# Patient Record
Sex: MALE | Race: WHITE | NOT HISPANIC OR LATINO | Employment: OTHER | URBAN - NONMETROPOLITAN AREA
[De-identification: names, ages, dates, MRNs, and addresses within clinical notes are randomized per-mention and may not be internally consistent; named-entity substitution may affect disease eponyms.]

---

## 2017-02-27 ENCOUNTER — OFFICE VISIT (OUTPATIENT)
Dept: CARDIOLOGY | Facility: CLINIC | Age: 82
End: 2017-02-27

## 2017-02-27 VITALS
HEART RATE: 74 BPM | SYSTOLIC BLOOD PRESSURE: 118 MMHG | HEIGHT: 65 IN | WEIGHT: 161.2 LBS | DIASTOLIC BLOOD PRESSURE: 68 MMHG | BODY MASS INDEX: 26.86 KG/M2

## 2017-02-27 DIAGNOSIS — I48.0 PAROXYSMAL ATRIAL FIBRILLATION (HCC): Primary | ICD-10-CM

## 2017-02-27 DIAGNOSIS — I25.810 CORONARY ARTERY DISEASE INVOLVING CORONARY BYPASS GRAFT OF NATIVE HEART WITHOUT ANGINA PECTORIS: ICD-10-CM

## 2017-02-27 DIAGNOSIS — I10 ESSENTIAL HYPERTENSION: ICD-10-CM

## 2017-02-27 DIAGNOSIS — E78.2 MIXED HYPERLIPIDEMIA: ICD-10-CM

## 2017-02-27 PROBLEM — I48.20 CHRONIC ATRIAL FIBRILLATION (HCC): Status: ACTIVE | Noted: 2017-02-27

## 2017-02-27 PROCEDURE — 99205 OFFICE O/P NEW HI 60 MIN: CPT | Performed by: INTERNAL MEDICINE

## 2017-02-27 PROCEDURE — 93000 ELECTROCARDIOGRAM COMPLETE: CPT | Performed by: INTERNAL MEDICINE

## 2017-02-27 NOTE — PROGRESS NOTES
Referring Provider: Antwon Lopez MD    Reason for Follow-up Visit: AFIB    Subjective .   Chief Complaint:   Chief Complaint   Patient presents with   • Atrial Fibrillation     Bradycardia, palpitations - Per Dr. Jessica Posey, [place]    • Edema     left ankle when too much sodium intake        History of present illness:  Romario Garcia is a 87 y.o. yo male with history of CAD, s/p CABG. Denies CP or SOB. Has recently diagnosed Afib. He noticed an irregular heart rate on his BP monitor       History  Past Medical History   Diagnosis Date   • A-fib    • Anemia    • Bradycardia    • CAD (coronary artery disease)    • Constipation    • DJD (degenerative joint disease)    • H/O colonoscopy    • Hyperlipidemia    • Hypertension    • Kidney disease    • Myocardial infarction    • PVC (premature ventricular contraction)    • Sleep apnea    • Varicose veins of legs    ,   Past Surgical History   Procedure Laterality Date   • Hemorrhoidectomy     • Coronary artery bypass graft     • Coronary angioplasty with stent placement     • Colonoscopy     • Cardiac catheterization     ,   Family History   Problem Relation Age of Onset   • Stroke Mother    • Heart disease Father    • Coronary artery disease Brother    • Coronary artery disease Paternal Uncle      x4 uncles   ,   Social History   Substance Use Topics   • Smoking status: Former Smoker   • Smokeless tobacco: Current User     Types: Chew      Comment: quit age 45    • Alcohol use No   ,     Medications  Current Outpatient Prescriptions   Medication Sig Dispense Refill   • aspirin 81 MG EC tablet Take 81 mg by mouth Daily.     • lisinopril (PRINIVIL,ZESTRIL) 10 MG tablet Take 10 mg by mouth Daily.     • nitroglycerin (NITROSTAT) 0.4 MG SL tablet Place 0.4 mg under the tongue Every 5 (Five) Minutes As Needed for chest pain. Take no more than 3 doses in 15 minutes.     • pravastatin (PRAVACHOL) 20 MG tablet Take 20 mg by mouth Daily.     • metoprolol tartrate  "(LOPRESSOR) 25 MG tablet Take 25 mg by mouth 2 (Two) Times a Day. 1/2 bid       No current facility-administered medications for this visit.        Allergies:  Review of patient's allergies indicates no known allergies.    Review of Systems  Review of Systems   HENT: Negative for nosebleeds.    Cardiovascular: Negative for chest pain, claudication, dyspnea on exertion, irregular heartbeat, leg swelling, near-syncope, orthopnea, palpitations, paroxysmal nocturnal dyspnea and syncope.   Respiratory: Negative for cough, hemoptysis and shortness of breath.    Gastrointestinal: Negative for dysphagia, hematemesis and melena.   Genitourinary: Negative for hematuria.       Objective     Physical Exam:  Visit Vitals   • /68 (BP Location: Left arm, Patient Position: Sitting)   • Pulse 74   • Ht 65\" (165.1 cm)   • Wt 161 lb 3.2 oz (73.1 kg)   • BMI 26.83 kg/m2     Physical Exam   Constitutional: He is oriented to person, place, and time. He appears well-developed and well-nourished. No distress.   HENT:   Head: Normocephalic.   Eyes: No scleral icterus.   Neck: Normal range of motion. Neck supple.   Cardiovascular: Normal rate and normal heart sounds.  An irregularly irregular rhythm present. Exam reveals no gallop and no friction rub.    No murmur heard.  Pulmonary/Chest: Effort normal and breath sounds normal. No respiratory distress. He has no wheezes. He has no rales.   Abdominal: Soft. Bowel sounds are normal.   Musculoskeletal: He exhibits no edema.   Neurological: He is alert and oriented to person, place, and time.   Skin: Skin is warm and dry. He is not diaphoretic. No erythema.   Psychiatric: He has a normal mood and affect. His behavior is normal.       Results Review:    ECG 12 Lead  Date/Time: 2/27/2017 10:17 AM  Performed by: AYDEN PETTIT  Authorized by: AYDEN PETTIT   Comparison: compared with previous ECG   Comparison to previous ECG: afib is new  Rhythm: atrial fibrillation  Rate: normal  Conduction: " right bundle branch block  ST Segments: ST segments normal  T Waves: T waves normal  QRS axis: normal  Clinical impression: abnormal ECG            No results found for any previous visit.    Assessment/Plan   Romario was seen today for atrial fibrillation and edema.    Diagnoses and all orders for this visit:    Paroxysmal atrial fibrillation, needs to be anticoagulated. Cant afford NOACS. Should start coumadin and stop asa. Discussed rate control vs cardioversion. He wishes to do neither and take his chances with the stroke.    Coronary artery disease involving coronary bypass graft of native heart without angina pectoris    Mixed hyperlipidemia, Patient's statin therapy is followed by PCP.    Essential hypertension, good control    Other orders  -     ECG 12 Lead